# Patient Record
Sex: MALE | Race: WHITE
[De-identification: names, ages, dates, MRNs, and addresses within clinical notes are randomized per-mention and may not be internally consistent; named-entity substitution may affect disease eponyms.]

---

## 2018-12-14 ENCOUNTER — HOSPITAL ENCOUNTER (EMERGENCY)
Dept: HOSPITAL 88 - FSED | Age: 48
Discharge: HOME | End: 2018-12-14
Payer: COMMERCIAL

## 2018-12-14 VITALS — BODY MASS INDEX: 19.33 KG/M2 | WEIGHT: 135 LBS | HEIGHT: 70 IN
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
## 2018-12-14 DIAGNOSIS — S61.441A: Primary | ICD-10-CM
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
## 2018-12-14 PROCEDURE — 99283 EMERGENCY DEPT VISIT LOW MDM: CPT

## 2018-12-14 NOTE — DIAGNOSTIC IMAGING REPORT
RIGHT HAND X-RAY - 4 VIEWS    



HISTORY:       

^[id number]

^1918    

COMPARISON: None available.

     

FINDINGS:

Bones:

No acute displaced fracture.  

Osseous alignment is within normal limits.



Joints:

The joint spaces are well-maintained.



Soft tissues:

Few 2 to 3 mm radiopaque foreign bodies in the superficial soft tissues

adjacent to the first proximal phalanx and distal second metacarpal. Diffuse

soft tissue swelling between the first and second digit.





IMPRESSION: 

Few a small radiopaque foreign bodies adjacent to the right and first second

digits.



Signed by: Dr. Suzi Dawn M.D. on 12/14/2018 7:35 PM